# Patient Record
Sex: FEMALE | Race: WHITE | NOT HISPANIC OR LATINO | Employment: OTHER | URBAN - METROPOLITAN AREA
[De-identification: names, ages, dates, MRNs, and addresses within clinical notes are randomized per-mention and may not be internally consistent; named-entity substitution may affect disease eponyms.]

---

## 2017-02-07 ENCOUNTER — ALLSCRIPTS OFFICE VISIT (OUTPATIENT)
Dept: OTHER | Facility: OTHER | Age: 67
End: 2017-02-07

## 2017-02-07 DIAGNOSIS — G45.9 TRANSIENT CEREBRAL ISCHEMIC ATTACK: ICD-10-CM

## 2017-02-07 DIAGNOSIS — R05.9 COUGH: ICD-10-CM

## 2017-02-07 LAB
FLUAV AG SPEC QL IA: NEGATIVE
FLUAV AG SPEC QL IA: NEGATIVE
INFLUENZA B AG (HISTORICAL): NEGATIVE
INFLUENZA B AG (HISTORICAL): NEGATIVE

## 2017-02-08 ENCOUNTER — TRANSCRIBE ORDERS (OUTPATIENT)
Dept: ADMINISTRATIVE | Age: 67
End: 2017-02-08

## 2017-02-08 ENCOUNTER — HOSPITAL ENCOUNTER (OUTPATIENT)
Dept: RADIOLOGY | Age: 67
Discharge: HOME | End: 2017-02-08
Payer: MEDICARE

## 2017-02-08 DIAGNOSIS — R05.9 COUGH: ICD-10-CM

## 2017-02-08 PROCEDURE — 71020 HB CHEST X-RAY 2VW FRONTAL&LATL: CPT

## 2017-02-09 ENCOUNTER — GENERIC CONVERSION - ENCOUNTER (OUTPATIENT)
Dept: OTHER | Facility: OTHER | Age: 67
End: 2017-02-09

## 2017-03-07 LAB
A/G RATIO (HISTORICAL): 1.9 (CALC) (ref 1–2.5)
ALBUMIN SERPL BCP-MCNC: 4.3 G/DL (ref 3.6–5.1)
ALP SERPL-CCNC: 138 U/L (ref 33–130)
ALT SERPL W P-5'-P-CCNC: 26 U/L (ref 6–29)
AST SERPL W P-5'-P-CCNC: 24 U/L (ref 10–35)
BILIRUB SERPL-MCNC: 0.6 MG/DL (ref 0.2–1.2)
BUN SERPL-MCNC: 15 MG/DL (ref 7–25)
BUN/CREA RATIO (HISTORICAL): ABNORMAL (CALC) (ref 6–22)
CALCIUM (ADJUSTED FOR ALBUMIN) (HISTORICAL): 10.1 MG/DL (CALC) (ref 8.6–10.2)
CALCIUM SERPL-MCNC: 10 MG/DL (ref 8.6–10.4)
CHLORIDE SERPL-SCNC: 106 MMOL/L (ref 98–110)
CHOLEST SERPL-MCNC: 187 MG/DL (ref 125–200)
CHOLEST/HDLC SERPL: 2.5 (CALC)
CO2 SERPL-SCNC: 27 MMOL/L (ref 20–31)
CREAT SERPL-MCNC: 0.8 MG/DL (ref 0.5–0.99)
EGFR AFRICAN AMERICAN (HISTORICAL): 88 ML/MIN/1.73M2
EGFR-AMERICAN CALC (HISTORICAL): 76 ML/MIN/1.73M2
GAMMA GLOBULIN (HISTORICAL): 2.3 G/DL (CALC) (ref 1.9–3.7)
GLUCOSE (HISTORICAL): 81 MG/DL (ref 65–99)
HDLC SERPL-MCNC: 76 MG/DL
LDL CHOLESTEROL (HISTORICAL): 93 MG/DL (CALC)
NON-HDL-CHOL (CHOL-HDL) (HISTORICAL): 111 MG/DL (CALC)
POTASSIUM SERPL-SCNC: 3.9 MMOL/L (ref 3.5–5.3)
SODIUM SERPL-SCNC: 142 MMOL/L (ref 135–146)
TOTAL PROTEIN (HISTORICAL): 6.6 G/DL (ref 6.1–8.1)
TRIGL SERPL-MCNC: 92 MG/DL

## 2017-03-13 ENCOUNTER — GENERIC CONVERSION - ENCOUNTER (OUTPATIENT)
Dept: OTHER | Facility: OTHER | Age: 67
End: 2017-03-13

## 2017-06-15 ENCOUNTER — TRANSCRIBE ORDERS (OUTPATIENT)
Dept: ADMINISTRATIVE | Facility: HOSPITAL | Age: 67
End: 2017-06-15

## 2017-06-15 DIAGNOSIS — Z13.820 ENCOUNTER FOR SCREENING FOR OSTEOPOROSIS: Primary | ICD-10-CM

## 2017-06-26 ENCOUNTER — ALLSCRIPTS OFFICE VISIT (OUTPATIENT)
Dept: OTHER | Facility: OTHER | Age: 67
End: 2017-06-26

## 2017-06-26 ENCOUNTER — APPOINTMENT (OUTPATIENT)
Dept: RADIOLOGY | Age: 67
End: 2017-06-26
Payer: MEDICARE

## 2017-06-26 ENCOUNTER — TRANSCRIBE ORDERS (OUTPATIENT)
Dept: ADMINISTRATIVE | Age: 67
End: 2017-06-26

## 2017-06-26 DIAGNOSIS — M25.551 PAIN IN RIGHT HIP: ICD-10-CM

## 2017-06-26 DIAGNOSIS — E04.2 NONTOXIC MULTINODULAR GOITER: ICD-10-CM

## 2017-06-26 PROCEDURE — 73502 X-RAY EXAM HIP UNI 2-3 VIEWS: CPT

## 2017-06-28 ENCOUNTER — GENERIC CONVERSION - ENCOUNTER (OUTPATIENT)
Dept: OTHER | Facility: OTHER | Age: 67
End: 2017-06-28

## 2017-06-30 ENCOUNTER — HOSPITAL ENCOUNTER (OUTPATIENT)
Dept: NON INVASIVE DIAGNOSTICS | Facility: CLINIC | Age: 67
Discharge: HOME | End: 2017-06-30
Payer: MEDICARE

## 2017-06-30 DIAGNOSIS — G45.9 TRANSIENT CEREBRAL ISCHEMIC ATTACK: ICD-10-CM

## 2017-06-30 PROCEDURE — 93880 EXTRACRANIAL BILAT STUDY: CPT

## 2017-07-01 ENCOUNTER — GENERIC CONVERSION - ENCOUNTER (OUTPATIENT)
Dept: OTHER | Facility: OTHER | Age: 67
End: 2017-07-01

## 2017-07-05 ENCOUNTER — HOSPITAL ENCOUNTER (OUTPATIENT)
Dept: RADIOLOGY | Age: 67
Discharge: HOME | End: 2017-07-05
Payer: MEDICARE

## 2017-07-05 DIAGNOSIS — Z13.820 ENCOUNTER FOR SCREENING FOR OSTEOPOROSIS: ICD-10-CM

## 2017-07-05 DIAGNOSIS — Z12.31 ENCOUNTER FOR SCREENING MAMMOGRAM FOR MALIGNANT NEOPLASM OF BREAST: ICD-10-CM

## 2017-07-05 PROCEDURE — G0202 SCR MAMMO BI INCL CAD: HCPCS

## 2017-07-05 PROCEDURE — 77080 DXA BONE DENSITY AXIAL: CPT

## 2017-07-07 ENCOUNTER — LAB CONVERSION - ENCOUNTER (OUTPATIENT)
Dept: OTHER | Facility: OTHER | Age: 67
End: 2017-07-07

## 2017-07-07 LAB
25(OH)D3 SERPL-MCNC: 39 NG/ML (ref 30–100)
A/G RATIO (HISTORICAL): 2 (CALC) (ref 1–2.5)
ALBUMIN SERPL BCP-MCNC: 4.2 G/DL (ref 3.6–5.1)
ALP SERPL-CCNC: 106 U/L (ref 33–130)
ALT SERPL W P-5'-P-CCNC: 26 U/L (ref 6–29)
AST SERPL W P-5'-P-CCNC: 27 U/L (ref 10–35)
BILIRUB SERPL-MCNC: 0.7 MG/DL (ref 0.2–1.2)
BILIRUBIN DIRECT (HISTORICAL): 0.2 MG/DL
BUN SERPL-MCNC: 19 MG/DL (ref 7–25)
BUN/CREA RATIO (HISTORICAL): NORMAL (CALC) (ref 6–22)
CHLORIDE SERPL-SCNC: 106 MMOL/L (ref 98–110)
CO2 SERPL-SCNC: 27 MMOL/L (ref 20–31)
CREAT SERPL-MCNC: 0.75 MG/DL (ref 0.5–0.99)
DEPRECATED RDW RBC AUTO: 13.2 % (ref 11–15)
EGFR AFRICAN AMERICAN (HISTORICAL): 94 ML/MIN/1.73M2
EGFR-AMERICAN CALC (HISTORICAL): 81 ML/MIN/1.73M2
FERRITIN SERPL-MCNC: 31 NG/ML (ref 20–288)
FOLATE SERPL-MCNC: 18.7 NG/ML
GAMMA GLOBULIN (HISTORICAL): 2.1 G/DL (CALC) (ref 1.9–3.7)
GLUCOSE (HISTORICAL): 82 MG/DL (ref 65–99)
HCT VFR BLD AUTO: 43.2 % (ref 35–45)
HGB BLD-MCNC: 14.2 G/DL (ref 11.7–15.5)
INDIRECT BILIRUBIN (HISTORICAL): 0.5 MG/DL (CALC) (ref 0.2–1.2)
IRON SATN MFR SERPL: 43 % (CALC) (ref 11–50)
IRON SERPL-MCNC: 170 MCG/DL (ref 45–160)
MCH RBC QN AUTO: 31.7 PG (ref 27–33)
MCHC RBC AUTO-ENTMCNC: 32.8 G/DL (ref 32–36)
MCV RBC AUTO: 96.8 FL (ref 80–100)
PLATELET # BLD AUTO: 141 THOUSAND/UL (ref 140–400)
PMV BLD AUTO: 8.2 FL (ref 7.5–12.5)
POTASSIUM SERPL-SCNC: 4.3 MMOL/L (ref 3.5–5.3)
RBC # BLD AUTO: 4.47 MILLION/UL (ref 3.8–5.1)
SODIUM SERPL-SCNC: 140 MMOL/L (ref 135–146)
TIBC SERPL-MCNC: 394 MCG/DL (CALC) (ref 250–450)
TOTAL PROTEIN (HISTORICAL): 6.3 G/DL (ref 6.1–8.1)
TSH SERPL DL<=0.05 MIU/L-ACNC: 1.28 MIU/L (ref 0.4–4.5)
VIT B12 SERPL-MCNC: 433 PG/ML (ref 200–1100)
WBC # BLD AUTO: 4 THOUSAND/UL (ref 3.8–10.8)

## 2017-07-12 ENCOUNTER — ALLSCRIPTS OFFICE VISIT (OUTPATIENT)
Dept: OTHER | Facility: OTHER | Age: 67
End: 2017-07-12

## 2017-07-17 ENCOUNTER — GENERIC CONVERSION - ENCOUNTER (OUTPATIENT)
Dept: OTHER | Facility: OTHER | Age: 67
End: 2017-07-17

## 2017-08-07 ENCOUNTER — GENERIC CONVERSION - ENCOUNTER (OUTPATIENT)
Dept: OTHER | Facility: OTHER | Age: 67
End: 2017-08-07

## 2017-12-11 ENCOUNTER — GENERIC CONVERSION - ENCOUNTER (OUTPATIENT)
Dept: OTHER | Facility: OTHER | Age: 67
End: 2017-12-11

## 2017-12-11 DIAGNOSIS — Z00.00 ENCOUNTER FOR GENERAL ADULT MEDICAL EXAMINATION WITHOUT ABNORMAL FINDINGS: ICD-10-CM

## 2017-12-13 ENCOUNTER — LAB CONVERSION - ENCOUNTER (OUTPATIENT)
Dept: OTHER | Facility: OTHER | Age: 67
End: 2017-12-13

## 2017-12-13 LAB
BASOPHILS # BLD AUTO: 0.6 %
BASOPHILS # BLD AUTO: 29 CELLS/UL (ref 0–200)
DEPRECATED RDW RBC AUTO: 13.2 % (ref 11–15)
EOSINOPHIL # BLD AUTO: 202 CELLS/UL (ref 15–500)
EOSINOPHIL # BLD AUTO: 4.2 %
FERRITIN SERPL-MCNC: 16 NG/ML (ref 20–288)
HCT VFR BLD AUTO: 37.2 % (ref 35–45)
HGB BLD-MCNC: 11.6 G/DL (ref 11.7–15.5)
IRON SATN MFR SERPL: 23 % (CALC) (ref 11–50)
IRON SERPL-MCNC: 100 MCG/DL (ref 45–160)
LYMPHOCYTES # BLD AUTO: 1531 CELLS/UL (ref 850–3900)
LYMPHOCYTES # BLD AUTO: 31.9 %
MCH RBC QN AUTO: 27.5 PG (ref 27–33)
MCHC RBC AUTO-ENTMCNC: 31.2 G/DL (ref 32–36)
MCV RBC AUTO: 88.2 FL (ref 80–100)
MONOCYTES # BLD AUTO: 490 CELLS/UL (ref 200–950)
MONOCYTES (HISTORICAL): 10.2 %
NEUTROPHILS # BLD AUTO: 2549 CELLS/UL (ref 1500–7800)
NEUTROPHILS # BLD AUTO: 53.1 %
PLATELET # BLD AUTO: 202 THOUSAND/UL (ref 140–400)
PMV BLD AUTO: 10.5 FL (ref 7.5–12.5)
RBC # BLD AUTO: 4.22 MILLION/UL (ref 3.8–5.1)
TIBC SERPL-MCNC: 437 MCG/DL (CALC) (ref 250–450)
WBC # BLD AUTO: 4.8 THOUSAND/UL (ref 3.8–10.8)

## 2017-12-15 ENCOUNTER — GENERIC CONVERSION - ENCOUNTER (OUTPATIENT)
Dept: OTHER | Facility: OTHER | Age: 67
End: 2017-12-15

## 2018-01-09 NOTE — RESULT NOTES
Message  Blood work looks fine  Osteoporosis on DXA- will discuss treatment further on next visit  Prednisone helped somewhat with her hip  I will send for a taper she can take while she is on vacation next week if we cannot get her in with ortho/IR  She would like records of her xrays-reports can be sent from here, images she will need to get from the hospital      Verified Results  (Q) CBC (H/H, RBC, INDICES, WBC, PLT) [id number] 09:39AM Rich Fines     Test Name Result Flag Reference   WHITE BLOOD CELL COUNT 4 0 Thousand/uL  3 8-10 8   RED BLOOD CELL COUNT 4 47 Million/uL  3 80-5 10   HEMOGLOBIN 14 2 g/dL  11 7-15 5   HEMATOCRIT 43 2 %  35 0-45 0   MCV 96 8 fL  80 0-100 0   MCH 31 7 pg  27 0-33 0   MCHC 32 8 g/dL  32 0-36 0   RDW 13 2 %  11 0-15 0   PLATELET COUNT 096 Thousand/uL  140-400   MPV 8 2 fL  7 5-12 5     (Q) BASIC METABOLIC PANEL W/O CA [id number] 09:39AM Rich Fines     Test Name Result Flag Reference   GLUCOSE 82 mg/dL  65-99   Fasting reference interval   UREA NITROGEN (BUN) 19 mg/dL  7-25   CREATININE 0 75 mg/dL  0 50-0 99   For patients >52years of age, the reference limit  for Creatinine is approximately 13% higher for people  identified as -American  eGFR NON-AFR   AMERICAN 81 mL/min/1 73m2  > OR = 60   eGFR AFRICAN AMERICAN 94 mL/min/1 73m2  > OR = 60   BUN/CREATININE RATIO   7-13   NOT APPLICABLE (calc)   SODIUM 140 mmol/L  135-146   POTASSIUM 4 3 mmol/L  3 5-5 3   CHLORIDE 106 mmol/L     CARBON DIOXIDE 27 mmol/L  20-31     (Q) IRON, TIBC AND FERRITIN PANEL [id number] 09:39AM Rich Fines     Test Name Result Flag Reference   IRON, TOTAL 170 mcg/dL H    IRON BINDING CAPACITY 394 mcg/dL (calc)  250-450   % SATURATION 43 % (calc)  11-50   FERRITIN 31 ng/mL       (Q) VITAMIN B12/FOLATE, SERUM PANEL [id number] 09:39AM Rich Fines     Test Name Result Flag Reference   VITAMIN B12 433 pg/mL  200-1100   FOLATE, SERUM 18 7 ng/mL     Reference Range                             Low: <3 4                             Borderline:    3 4-5 4                             Normal:        >5 4     *(Q) VITAMIN D, 25-HYDROXY, LC/MS/MS [id number] 09:39AM Karthikeyan Chairez     Test Name Result Flag Reference   VITAMIN D, 25-OH, TOTAL 39 ng/mL     Vitamin D Status         25-OH Vitamin D:     Deficiency:                    <20 ng/mL  Insufficiency:             20 - 29 ng/mL  Optimal:                 > or = 30 ng/mL     For 25-OH Vitamin D testing on patients on   D2-supplementation and patients for whom quantitation   of D2 and D3 fractions is required, the QuestAssureD(TM)  25-OH VIT D, (D2,D3), LC/MS/MS is recommended: order   code 65872 (patients >2yrs)  For more information on this test, go to:  http://Pict/faq/URF760  (This link is being provided for   informational/educational purposes only )     (Q) TSH, 3RD GENERATION W/REFLEX TO FT4 [id number] 09:39AM Karthikeyan Chairez   REPORT COMMENT:  FASTING:YES     Test Name Result Flag Reference   TSH W/REFLEX TO FT4 1 28 mIU/L  0 40-4 50     (1) HEPATIC FUNCTION PANEL [id number] 09:39AM Karthikeyan Chairez     Test Name Result Flag Reference   PROTEIN, TOTAL 6 3 g/dL  6 1-8 1   ALBUMIN 4 2 g/dL  3 6-5 1   GLOBULIN 2 1 g/dL (calc)  1 9-3 7   ALBUMIN/GLOBULIN RATIO 2 0 (calc)  1 0-2 5   BILIRUBIN, TOTAL 0 7 mg/dL  0 2-1 2   BILIRUBIN, DIRECT 0 2 mg/dL  < OR = 0 2   BILIRUBIN, INDIRECT 0 5 mg/dL (calc)  0 2-1 2   ALKALINE PHOSPHATASE 106 U/L     AST 27 U/L  10-35   ALT 26 U/L  6-29     * DXA BONE DENSITY SPINE HIP AND PELVIS [id number] 10:29AM Karthikeyan Chairez     Test Name Result Flag Reference   DXA BONE DENSITY SPINE HIP AND PELVIS (Report)     DXA SCAN     CLINICAL HISTORY: 68-year-old woman  Menopause at age 44  OTHER RISK FACTORS: None  TECHNIQUE: Bone densitometry was performed using a Hologic Horizon A bone densitometer  Regions of interest appear properly placed  COMPARISON: December 16, 2010       RESULTS:      LUMBAR SPINE L1-L4: BMD 0 875 gm/cm2 / T-score -1 6 / Z score 0 5   (Lumbar levels within parentheses [if any] represent vertebrae excluded from analysis due to local structural abnormalities or artifact)  LEFT TOTAL HIP: BMD 0 720 gm/cm2 / T-score -1 8 / Z score -0 4   LEFT FEMORAL NECK: BMD 0 569 gm/cm2 / T score -2 5 / Z score -0 8     CHANGE: Since the last DXA:   LUMBAR SPINE BMD has decreased 0 034 gm/cm2 or 3 7%  This change is statistically significant  HIP BMD has decreased 0 019 gm/cm2 or 2 5%  This change is statistically significant  IMPRESSION:     1  Osteoporosis, based on the left femoral neck  2  Since a DXA study from December 16, 2010, bone mineral density has decreased 3 7% in the lumbar spine and 2 5% in the left hip  These changes are statistically significant  3  The 10 year risk of hip fracture is 3 1% with the 10 year risk of major osteoporotic fracture being 14% as calculated by the [facility]/WHO fracture risk assessment tool (FRAX)  4  The current NOF guidelines recommend treating patients with a T-score of -2 5 or less in the lumbar spine or hips, or in post-menopausal women and men over the age of 48 with low bone mass (osteopenia) and a FRAX 10 year risk score of >3% for hip    fracture and/or >20% for major osteoporotic fracture  5  A daily intake of at least 1200 mg calcium and vitamin D 800- 1000 IU, as well as weight bearing and muscle strengthening exercise, fall prevention and avoidance of tobacco and excessive alcohol as preventive measurements are suggested  6  Follow-up DXA in two years is recommended for most patients  A one year follow-up DXA is recommended after initiation or change in therapy for osteoporosis  More frequent evaluation is also appropriate for patients with conditions associated with    rapid bone loss, such as glucocorticoid therapy       The FRAX tool has not been validated in patients currently or previously treated with pharmacotherapy for osteoporosis  In such patients, clinical judgment must be exercised in interpreting FRAX scores  It is not appropriate to use FRAX to monitor    treatment response  WHO CLASSIFICATION:   Normal (a T-score of -1 0 or higher)   Low bone mineral density (a T-score of less than -1 0 but higher than -2 5)   Osteoporosis (a T-score of -2 5 or less)   Severe osteoporosis (a T-score of -2 5 or less with a fragility fracture)     Least significant change (lumbar spine): 0 014 g/cm2; 1 7%   Least significant change (total hip): 0 008 g/cm2; 1 1%   Least significant change (forearm): 0 011 g/cm2; 1 9%         Workstation performed: [id number]     Signed by:   Nikki Ballard MD   7/5/17       Plan  Right hip pain    · PredniSONE 10 MG Oral Tablet; 4 tabs PO daily for 3 days then 3 tabs PO daily for  2 days then 2 tabs PO daily for 2 days    Signatures   Electronically signed by : ALEXANDRIA Deng ; Jul 17 2017  5:52PM EST                       (Author)

## 2018-01-11 NOTE — RESULT NOTES
Message     Chest xray is normal   Finish antibiotic course        Verified Results  * XR CHEST PA & LATERAL [id number] 11:32AM Jesus Crown Order Number: [id number]     Test Name Result Flag Reference   XR CHEST PA & LATERAL (Report)     CHEST - DUAL ENERGY     INDICATION: Productive cough     COMPARISON: 8/7/2007     VIEWS: PA (including soft tissue/bone algorithms) and lateral projections; 4 images     FINDINGS:        Cardiomediastinal silhouette appears unremarkable  The lungs are clear  No pneumothorax or pleural effusion  Visualized osseous structures appear within normal limits for the patient's age  IMPRESSION:     No active pulmonary disease         Workstation performed: [id number]     Signed by:   Indira Bravo MD   2/9/17       Signatures   Electronically signed by : ALEXANDRIA Moreira ; Feb 9 2017  2:07PM EST                       (Author)

## 2018-01-12 VITALS
HEIGHT: 65 IN | BODY MASS INDEX: 29.66 KG/M2 | RESPIRATION RATE: 20 BRPM | DIASTOLIC BLOOD PRESSURE: 62 MMHG | TEMPERATURE: 100 F | HEART RATE: 115 BPM | WEIGHT: 178.03 LBS | SYSTOLIC BLOOD PRESSURE: 98 MMHG

## 2018-01-12 NOTE — RESULT NOTES
Subclavian        122      0                          CONCLUSION:   Impression   RIGHT:   There is <50% stenosis noted in the internal carotid artery  Plaque is   calcified  Vertebral artery flow is antegrade  There is no significant subclavian artery disease  Incidental finding: multiple thyroid nodules noted  LEFT:   There is <50% stenosis noted in the internal carotid artery  Plaque is   calcified  Vertebral artery flow is antegrade  There is no significant subclavian artery disease  Incidental finding: multiple thyroid nodules noted  Internal carotid artery stenosis determination by consensus criteria from:   Madhav Parada et al  Carotid Artery Stenosis: Gray-Scale and Doppler US Diagnosis   - Society of Radiologists in [address] [place] [address], Radiology 2003;   [phone]        SIGNATURE:   Electronically Signed by: Yonathan Bland MD on 2017-07-01 05:34:12 PM       Plan  Multinodular thyroid    · US THYROID; Status:Hold For - Scheduling; Requested for:[id number];     Signatures   Electronically signed by : ALEXANDRIA Moore ; Jul 1 2017  5:48PM EST                       (Author)

## 2018-01-13 VITALS
SYSTOLIC BLOOD PRESSURE: 144 MMHG | HEART RATE: 83 BPM | DIASTOLIC BLOOD PRESSURE: 85 MMHG | WEIGHT: 176 LBS | BODY MASS INDEX: 34.55 KG/M2 | HEIGHT: 60 IN

## 2018-01-13 VITALS
SYSTOLIC BLOOD PRESSURE: 138 MMHG | OXYGEN SATURATION: 97 % | BODY MASS INDEX: 34.55 KG/M2 | DIASTOLIC BLOOD PRESSURE: 78 MMHG | WEIGHT: 176 LBS | HEART RATE: 100 BPM | HEIGHT: 60 IN | TEMPERATURE: 97.9 F | RESPIRATION RATE: 16 BRPM

## 2018-01-17 NOTE — RESULT NOTES
Message  Spoke with pt  Start statin bec of h/o TIA,  LDL goal <70 and it is 93      Verified Results  (Q) LIPID PANEL WITH REFLEX TO DIRECT LDL [id number] 08:22AM Chinedu Worthy     Test Name Result Flag Reference   CHOLESTEROL, TOTAL 187 mg/dL  125-200   HDL CHOLESTEROL 76 mg/dL  > OR = 46   TRIGLICERIDES 92 mg/dL  <462   LDL-CHOLESTEROL 93 mg/dL (calc)  <130   Desirable range <100 mg/dL for patients with CHD or  diabetes and <70 mg/dL for diabetic patients with  known heart disease  CHOL/HDLC RATIO 2 5 (calc)  < OR = 5 0   NON HDL CHOLESTEROL 111 mg/dL (calc)     Target for non-HDL cholesterol is 30 mg/dL higher than   LDL cholesterol target  (Q) COMPREHENSIVE METABOLIC PNL W/ADJUSTED CALCIUM [id number] 08:22AM Chinedu Worthy   REPORT COMMENT:  FASTING:YES     Test Name Result Flag Reference   GLUCOSE 81 mg/dL  65-99   Fasting reference interval   UREA NITROGEN (BUN) 15 mg/dL  7-25   CREATININE 0 80 mg/dL  0 50-0 99   For patients >52years of age, the reference limit  for Creatinine is approximately 13% higher for people  identified as -American  eGFR NON-AFR  AMERICAN 76 mL/min/1 73m2  > OR = 60   eGFR AFRICAN AMERICAN 88 mL/min/1 73m2  > OR = 60   BUN/CREATININE RATIO   4-42   NOT APPLICABLE (calc)   SODIUM 142 mmol/L  135-146   POTASSIUM 3 9 mmol/L  3 5-5 3   CHLORIDE 106 mmol/L     CARBON DIOXIDE 27 mmol/L  20-31   CALCIUM 10 0 mg/dL  8 6-10 4   CALCIUM (ADJUSTED FOR$ALBUMIN) 10 1 mg/dL (calc)  8 6-10 2   PROTEIN, TOTAL 6 6 g/dL  6 1-8 1   ALBUMIN 4 3 g/dL  3 6-5 1   GLOBULIN 2 3 g/dL (calc)  1 9-3 7   ALBUMIN/GLOBULIN RATIO 1 9 (calc)  1 0-2 5   BILIRUBIN, TOTAL 0 6 mg/dL  0 2-1 2   ALKALINE PHOSPHATASE 138 U/L H    AST 24 U/L  10-35   ALT 26 U/L  6-29       Plan  Transient ischemic attack (TIA)    · Atorvastatin Calcium 10 MG Oral Tablet; Take 1 tablet daily   · (1) CK (CPK); Status:Active; Requested FZZ:[id number];    · (Q) HEPATIC FUNCTION PANEL; Status:Active;  Requested QAZ:53BRA0992; · (Q) LIPID PANEL WITH REFLEX TO DIRECT LDL; Status:Active;  Requested  ATV:36BLD8400;     Signatures   Electronically signed by : ALEXANDRIA Ponce ; Mar 13 2017 10:49AM EST                       (Author)

## 2018-01-17 NOTE — RESULT NOTES
Message   #1  Please call the patient with the results of her mammogram       #2  The radiologist states that her mammogram looks well and recommends repeating another mammogram in 1 year, unless she feels something sooner  #3  Mail her the order slip for next year's mammogram       #4  You may leave a message, if her communication consent allows for it  Verified Results  * MAMMO SCREENING BILATERAL W CAD [id number] 10:17AM Tito Hamilton Order Number: [id number]   Performing Comments: MAIL copy to Dr Estuardo Duque   - Patient Instructions: To schedule this appointment, please contact Central Scheduling at [phone]  Do not wear any perfume, powder, lotion or deodorant on breast or underarm area  Please bring your doctors order, referral (if needed) and insurance information with you on the day of the test  Failure to bring this information may result in this test being rescheduled  Arrive 15 minutes prior to your appointment time to register  On the day of your test, please bring any prior mammogram or breast studies with you that were not performed at a [facility]  Failure to bring prior exams may result in your test needing to be rescheduled  Test Name Result Flag Reference   MAMMO SCREENING BILATERAL W CAD (Report)     Patient History:   Patient is postmenopausal    Family history of prostate cancer in father at age 61 and    premenopausal breast cancer in daughter at age 43  Took hormonal contraceptives for 15 years  Took estrogen for 10    years  Took unspecified hormones for 5 years  Patient has never smoked  Patient's BMI is 30 0  Reason for exam: screening (asymptomatic)  Mammo Screening Bilateral W CAD: June 29, 2016 - Check In #:    [de-identified]   Bilateral CC and MLO view(s) were taken  Technologist: RT Dominick(R)(M)   Prior study comparison: June 24, 2015, digital bilateral    screening mammogram performed at [address] [place] [address]      June 18, 2014, digital bilateral screening mammogram performed at   [address] [facility]  June 12, 2013, digital bilateral    screening mammogram performed at [facility]  June 1, 2012, digital bilateral screening mammogram performed at    [facility]  May 27, 2011, digital bilateral    screening mammogram performed at [facility]  There are scattered fibroglandular densities  No dominant soft tissue mass, architectural distortion or    suspicious calcifications are noted in either breast   The skin    and nipple structures are within normal limits  Scattered benign   appearing calcifications are noted  No significant changes when compared with prior studies  ASSESSMENT: BiRad:2 - Benign     Recommendation:   Routine screening mammogram of both breasts in 1 year  A    reminder letter will be scheduled  Analyzed by CAD     8-10% of cancers will be missed on mammography  Management of a    palpable abnormality must be based on clinical grounds  Patients   will be notified of their results via letter from our facility  Accredited by [employer] of Radiology and FDA       Transcription Location: LINDA [facility] 98: [id number]     Risk Value(s):   Tyrer-Cuzick 10 Year: 3 964%, Tyrer-Cuzick Lifetime: 7 134%,    Myriad Table: 2 6%, YARELIS 5 Year: 3 3%, NCI Lifetime: 10 4%       Plan  Encounter for screening mammogram for malignant neoplasm of breast    · * MAMMO SCREENING BILATERAL W CAD; Status:Hold For - Scheduling; Requested  for:30Jun2017;

## 2018-01-17 NOTE — RESULT NOTES
Message   Mild osteoarthritis in her right hip   Schedule with Dr Thea Rodriguez's orthopedics        Verified Results  * XR HIP/PELV 2-3 VWS RIGHT W PELVIS IF PERFORMED 26Jun2017 11:12AM Chaparrita Beatty Order Number: [id number]     Test Name Result Flag Reference   * XR HIP/PELV 2-3 VWS RIGHT (Report)     RIGHT HIP     INDICATION: Right lateral hip pain     COMPARISON: None     VIEWS: AP pelvis and 2 coned down views of the hip     IMAGES: 3     FINDINGS:     There is no acute fracture or dislocation  Diffuse joint space narrowing is seen throughout the hip joint  Minimal hypertrophic changes are noted in the acetabulum  Femoral head is unremarkable  Degenerative changes are also seen within the lower lumbar spine  Soft tissues are unremarkable  IMPRESSION:     Mild osteoarthritis right hip         Workstation performed: [id number]     Signed by:   Ana Hunter MD   6/28/17       Signatures   Electronically signed by : ALEXANDRIA Patricio ; Jun 28 2017  5:26PM EST                       (Author)

## 2018-01-18 NOTE — PROGRESS NOTES
Chief Complaint  I spoke with patient and explained to her about seeing pain management in regards to the cortisol injection but patient declined and will not make an appt  She "does not want to be bounced around from consult to consult with different providers " Later this week she will be relocating to [place] and will be transferring records down there  Active Problems    1  Anxiety disorder (300 00) (F41 9)   2  Benign essential hypertension (401 1) (I10)   3  Cerebrovascular disease (437 9) (I67 9)   4  Cough (786 2) (R05)   5  Encounter for screening for osteoporosis (V82 81) (Z13 820)   6  Encounter for screening for other nervous system disorders (V80 09) (Z13 858)   7  Encounter for screening mammogram for malignant neoplasm of breast (V76 12)   (Z12 31)   8  Esophageal reflux (530 81) (K21 9)   9  Hypogammaglobulinemia (279 00) (D80 1)   10  Insomnia (780 52) (G47 00)   11  Long term use of drug (V58 69) (Z79 899)   12  Mallet deformity of fourth finger, 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)   13  Multinodular thyroid (241 1) (E04 2)   14  Neck pain (723 1) (M54 2)   15  Need for influenza vaccination (V04 81) (Z23)   16  Need for pneumococcal vaccine (V03 82) (Z23)   17  Need for shingles vaccine (V04 89) (Z23)   18  Obesity (278 00) (E66 9)   19  Osteoarthritis of right hip (715 95) (M16 11)   20  Osteoporosis (733 00) (M81 0)   21  Right hip pain (719 45) (M25 551)   22  Screening for genitourinary condition (V81 6) (Z13 89)   23  Screening for lipoid disorders (V77 91) (Z13 220)   24  Screening for neurological condition (V80 09) (Z13 89)   25  Skin cancer, basal cell (173 91) (C44 91)   26  Status post gastric bypass for obesity (V45 86) (Z98 84)   27  Transient ischemic attack (TIA) (435 9) (G45 9)    Current Meds   1  Aspirin 325 MG Oral Tablet; TAKE 1 TABLET DAILY; Therapy: 38Lmd4332 to Recorded   2  Atorvastatin Calcium 10 MG Oral Tablet; Take 1 tablet by mouth  daily;    Therapy: 16ONW7268 to (97 103259)  Requested for: 01Aug2017; Last   Rx:01Aug2017 Ordered   3  Calcium 500 MG TABS; TAKE 1 TABLET DAILY; Therapy: 90Dnp5011 to Recorded   4  Clopidogrel Bisulfate 75 MG Oral Tablet; Take 1 tablet daily; Therapy: 71MGC6143 to (Evaluate:65Uhn0117)  Requested for: 72Nkm9973; Last   Rx:36Ppd0385 Ordered   5  Lisinopril 10 MG Oral Tablet; TAKE 1 TABLET DAILY; Last Rx:22Feb2017 Ordered   6  Multi-Vitamin Daily Oral Tablet; TAKE 1 TABLET DAILY; Therapy: 82Xqa5076 to Recorded   7  Naproxen 500 MG Oral Tablet; take 1 tablet every 12 hours as needed for pain  Take with   food; Therapy: 09ROQ8347 to (Evaluate:74Gul8948)  Requested for: 26Jun2017; Last   Rx:26Jun2017 Ordered   8  PARoxetine HCl - 10 MG Oral Tablet; Take 1 tablet by mouth  daily; Therapy: 53UJX2993 to (97 653411)  Requested for: 01Aug2017; Last   Rx:01Aug2017 Ordered   9  PredniSONE 20 MG Oral Tablet; TAKE 2 TABLETS DAILY WITH FOOD; Therapy: 39ZZO7349 to (Evaluate:89Pmt9992)  Requested for: 09Rdr8664; Last   Rx:24Kyn1790 Ordered   10  Slow Fe 142 (45 Fe) MG Oral Tablet Extended Release; Take 1 tablet daily; Therapy: 50Pts2114 to Recorded   11  TraMADol HCl - 50 MG Oral Tablet; TAKE 1 TABLET 3 TIMES DAILY AS NEEDED; Therapy: 51OCI9380 to (Evaluate:16Wex1175); Last Rx:89Wnu4813 Ordered   12  Vitamin B-12 ER 1000 MCG Oral Tablet Extended Release; TAKE 1 TABLET DAILY; Therapy: 74Yyw7903 to (Evaluate:32Rkg6816) Recorded   13  Vitamin B50 Complex Oral Tablet Extended Release; TAKE 1 TABLET DAILY; Therapy: (Recorded:90Scr9629) to Recorded   14  Vitamin D3 1000 UNIT Oral Tablet; TAKE 1 TABLET DAILY; Therapy: 72Eps9155 to (Evaluate:02Wci7427) Recorded    Allergies    1  Shellfish-derived Products   2  Iodine Tincture SWAB    3  No Known Environmental Allergies   4   No Known Food Allergies    Signatures   Electronically signed by : ALEXANDRIA Moreira ; Aug  7 2017  4:55PM EST                       (Review)

## 2018-01-18 NOTE — RESULT NOTES
Message   #1  Please call the patient with the results of her blood tests  #2  The results look well and I recommed that she continue with her current medications, until her next office visit  #3  You may leave a message, if her communication consent allows for it  Verified Results  (Q) CBC (INCLUDES DIFF/PLT) (REFL) [id number] 07:58AM [facility]     Test Name Result Flag Reference   WHITE BLOOD CELL COUNT 4 5 Thousand/uL  3 8-10 8   RED BLOOD CELL COUNT 4 95 Million/uL  3 80-5 10   HEMOGLOBIN 15 1 g/dL  11 7-15 5   HEMATOCRIT 47 3 % H 35 0-45 0   MCV 95 5 fL  80 0-100 0   MCH 30 5 pg  27 0-33 0   MCHC 31 9 g/dL L 32 0-36 0   RDW 13 6 %  11 0-15 0   PLATELET COUNT 285 Thousand/uL  140-400   MPV 8 8 fL  7 5-11 5   ABSOLUTE NEUTROPHILS 2453 cells/uL  1655-3931   ABSOLUTE LYMPHOCYTES 1490 cells/uL  850-3900   ABSOLUTE MONOCYTES 392 cells/uL  200-950   ABSOLUTE EOSINOPHILS 153 cells/uL     ABSOLUTE BASOPHILS 14 cells/uL  0-200   NEUTROPHILS 54 5 %     LYMPHOCYTES 33 1 %     MONOCYTES 8 7 %     EOSINOPHILS 3 4 %     BASOPHILS 0 3 %       (Q) COMPREHENSIVE METABOLIC PNL W/ADJUSTED CALCIUM [id number] 07:58AM [facility]     Test Name Result Flag Reference   GLUCOSE 78 mg/dL  65-99   Fasting reference interval   UREA NITROGEN (BUN) 14 mg/dL  7-25   CREATININE 0 77 mg/dL  0 50-0 99   For patients >52years of age, the reference limit  for Creatinine is approximately 13% higher for people  identified as -American  eGFR NON-AFR   AMERICAN 79 mL/min/1 73m2  > OR = 60   eGFR AFRICAN AMERICAN 92 mL/min/1 73m2  > OR = 60   BUN/CREATININE RATIO   1-11   NOT APPLICABLE (calc)   SODIUM 141 mmol/L  135-146   POTASSIUM 4 5 mmol/L  3 5-5 3   CHLORIDE 106 mmol/L     CARBON DIOXIDE 23 mmol/L  19-30   CALCIUM 10 1 mg/dL  8 6-10 4   CALCIUM (ADJUSTED FOR$ALBUMIN) 10 0 mg/dL (calc)  8 6-10 2   PROTEIN, TOTAL 6 6 g/dL  6 1-8 1   ALBUMIN 4 5 g/dL  3 6-5 1   GLOBULIN 2 1 g/dL (calc)  1 9-3 7 ALBUMIN/GLOBULIN RATIO 2 1 (calc)  1 0-2 5   BILIRUBIN, TOTAL 0 7 mg/dL  0 2-1 2   ALKALINE PHOSPHATASE 105 U/L     AST 25 U/L  10-35   ALT 25 U/L  6-29     (Q) HEPATITIS C ANTIBODY 07Jun2016 07:58AM Molly Nava     Test Name Result Flag Reference   HEPATITIS C ANTIBODY NON-REACTIVE  NON-REACTIVE   SIGNAL TO CUT-OFF 0 01  <1 00     (Q) IMMUNOFIXATION IGA,IGG,IGM QT  IMMUNOFIXATION SERUM IMMUNOGLOBULIN [id number] 07:58AM Molly Nava     Test Name Result Flag Reference   INTERPRETATION      Normal pattern  No monoclonal proteins detected     IMMUNOGLOBULIN A 208 mg/dL     IMMUNOGLOBULIN G 571 mg/dL L 480-7490   IMMUNOGLOBULIN M 60 mg/dL       (Q) VITAMIN B12/FOLATE, SERUM PANEL 07Jun2016 07:58AM Molly Nava     Test Name Result Flag Reference   VITAMIN B12 570 pg/mL  200-1100   FOLATE, SERUM 19 8 ng/mL     Reference Range                             Low:           <3 4                             Borderline:    3 4-5 4                             Normal:        >5 4     (Q) TSH, 3RD GENERATION W/REFLEX TO FT4 [id number] 07:58AM Molly Nava   REPORT COMMENT:  FASTING:YES     Test Name Result Flag Reference   TSH W/REFLEX TO FT4 2 09 mIU/L  0 40-4 50     (1) IRON SATURATION %, TIBC [id number] 07:58AM Molly Nava     Test Name Result Flag Reference   IRON, TOTAL 145 mcg/dL     IRON BINDING CAPACITY 402 mcg/dL (calc)  250-450   % SATURATION 36 % (calc)  11-50

## 2018-01-23 NOTE — MISCELLANEOUS
Message  labs reviewed by Dr Man Almanzar  levels do not explain patient's SOB  offered patient appt with our PA  she declined at this time as she is seeing her PCP on Monday  patient will call office with further needs      Active Problems    1  Anxiety disorder (300 00) (F41 9)   2  Benign essential hypertension (401 1) (I10)   3  Cerebrovascular disease (437 9) (I67 9)   4  Cough (786 2) (R05)   5  Encounter for screening for osteoporosis (V82 81) (Z13 820)   6  Encounter for screening for other nervous system disorders (V80 09) (Z13 858)   7  Encounter for screening mammogram for malignant neoplasm of breast (V76 12)   (Z12 31)   8  Esophageal reflux (530 81) (K21 9)   9  Hypogammaglobulinemia (279 00) (D80 1)   10  Insomnia (780 52) (G47 00)   11  Long term use of drug (V58 69) (Z79 899)   12  Mallet deformity of fourth finger, 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)   13  Multinodular thyroid (241 1) (E04 2)   14  Neck pain (723 1) (M54 2)   15  Need for influenza vaccination (V04 81) (Z23)   16  Need for pneumococcal vaccine (V03 82) (Z23)   17  Need for shingles vaccine (V04 89) (Z23)   18  Obesity (278 00) (E66 9)   19  Osteoarthritis of right hip (715 95) (M16 11)   20  Osteoporosis (733 00) (M81 0)   21  Right hip pain (719 45) (M25 551)   22  Screening for genitourinary condition (V81 6) (Z13 89)   23  Screening for lipoid disorders (V77 91) (Z13 220)   24  Screening for neurological condition (V80 09) (Z13 89)   25  Skin cancer, basal cell (173 91) (C44 91)   26  Status post gastric bypass for obesity (V45 86) (Z98 84)   27  Transient ischemic attack (TIA) (435 9) (G45 9)    Current Meds   1  Aspirin 325 MG Oral Tablet; TAKE 1 TABLET DAILY; Therapy: 49Bqk4322 to Recorded   2  Atorvastatin Calcium 10 MG Oral Tablet; Take 1 tablet by mouth  daily; Therapy: 39OQT8418 to [phone])  Requested for: 01Aug2017; Last   Rx:01Aug2017 Ordered   3  Calcium 500 MG TABS; TAKE 1 TABLET DAILY;    Therapy: 07Aug2013 to Recorded   4  Clopidogrel Bisulfate 75 MG Oral Tablet; Take 1 tablet daily; Therapy: 74LGX8203 to (Evaluate:76Mqz9783)  Requested for: 26Lfv9220; Last   Rx:16Ebc2089 Ordered   5  Lisinopril 10 MG Oral Tablet; TAKE 1 TABLET DAILY; Last Rx:22Feb2017 Ordered   6  Multi-Vitamin Daily Oral Tablet; TAKE 1 TABLET DAILY; Therapy: 55Pdi1408 to Recorded   7  Naproxen 500 MG Oral Tablet; take 1 tablet every 12 hours as needed for pain  Take with   food; Therapy: 78FER8441 to (Evaluate:61Esn3275)  Requested for: 26Jun2017; Last   Rx:26Jun2017 Ordered   8  PARoxetine HCl - 10 MG Oral Tablet; Take 1 tablet by mouth  daily; Therapy: 07DEL7950 to ()  Requested for: 01Aug2017; Last   Rx:01Aug2017 Ordered   9  PredniSONE 20 MG Oral Tablet; TAKE 2 TABLETS DAILY WITH FOOD; Therapy: 48BSU5349 to (Evaluate:74Krn0538)  Requested for: 71Cnm2015; Last   Rx:44Ovm9037 Ordered   10  Slow Fe 142 (45 Fe) MG Oral Tablet Extended Release; Take 1 tablet daily; Therapy: 78Hsh1512 to Recorded   11  TraMADol HCl - 50 MG Oral Tablet; TAKE 1 TABLET 3 TIMES DAILY AS NEEDED; Therapy: 15HIJ8553 to (Evaluate:94Jym3553); Last Rx:01Icc5226 Ordered   12  Vitamin B-12 ER 1000 MCG Oral Tablet Extended Release; TAKE 1 TABLET DAILY; Therapy: 99Vey6757 to (Evaluate:88Coi5323) Recorded   13  Vitamin B50 Complex Oral Tablet Extended Release; TAKE 1 TABLET DAILY; Therapy: (Recorded:10Pmb1743) to Recorded   14  Vitamin D3 1000 UNIT Oral Tablet; TAKE 1 TABLET DAILY; Therapy: 44Dfs7360 to (Evaluate:28Ilj5335) Recorded    Allergies    1  Shellfish-derived Products   2  Iodine Tincture SWAB    3  No Known Environmental Allergies   4   No Known Food Allergies    Signatures   Electronically signed by : Grady Goss, ; Dec 15 2017  9:51AM EST                       (Author)

## 2018-01-23 NOTE — MISCELLANEOUS
Message   Recorded as Task   Date: 12/11/2017 02:12 PM, Created By: Mindy Quintanilla   Task Name: Medical Complaint Callback   Assigned To: Catracho Ayala   Regarding Patient: Kristen Gaona, Status: In Progress   Comment:    Rekha Panchal - 11 Dec 2017 2:12 PM     TASK CREATED  Caller: Self; Medical Complaint; [phone] (Mobile Phone); [phone] (Home)  PT IS C/O SHORTNESS OF BREATH WITH THE SLIGHTEST EFFORT  SHE WAS ADVISED TO GO TO THE ER BY HER PCP; HOWEVER, SHE FEELS LIKE WHEN SHE NEEDS AN IRON TRANSFUSION  HER PCP SAYS THAT SHE SHOULD KNOW  PLEASE CALL TO DISCUSS (F/U APPT 12/29 W/DR GARCÍA)  Bruno Shirley - 11 Dec 2017 2:20 PM     TASK EDITED  left message for patient   Mer Parmar - 11 Dec 2017 2:20 PM     TASK IN PROGRESS   Mer Parmar - 11 Dec 2017 2:29 PM     TASK EDITED  patient wll go for labs at [address] [place] [address] then call me for result   GhulamMer - 11 Dec 2017 2:32 PM     TASK EDITED  lab scripts faxed to [facility]    [address] - 11 Dec 2017 2:32 PM     TASK EDITED  patient will call office for results        Active Problems    1  Anxiety disorder (300 00) (F41 9)   2  Benign essential hypertension (401 1) (I10)   3  Cerebrovascular disease (437 9) (I67 9)   4  Cough (786 2) (R05)   5  Encounter for screening for osteoporosis (V82 81) (Z13 820)   6  Encounter for screening for other nervous system disorders (V80 09) (Z13 858)   7  Encounter for screening mammogram for malignant neoplasm of breast (V76 12)   (Z12 31)   8  Esophageal reflux (530 81) (K21 9)   9  Hypogammaglobulinemia (279 00) (D80 1)   10  Insomnia (780 52) (G47 00)   11  Long term use of drug (V58 69) (Z79 899)   12  Mallet deformity of fourth finger, 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)   13  Multinodular thyroid (241 1) (E04 2)   14  Neck pain (723 1) (M54 2)   15  Need for influenza vaccination (V04 81) (Z23)   16  Need for pneumococcal vaccine (V03 82) (Z23)   17  Need for shingles vaccine (V04 89) (Z23)   18  Obesity (278 00) (E66 9)   19  Osteoarthritis of right hip (715 95) (M16 11)   20  Osteoporosis (733 00) (M81 0)   21  Right hip pain (719 45) (M25 551)   22  Screening for genitourinary condition (V81 6) (Z13 89)   23  Screening for lipoid disorders (V77 91) (Z13 220)   24  Screening for neurological condition (V80 09) (Z13 89)   25  Skin cancer, basal cell (173 91) (C44 91)   26  Status post gastric bypass for obesity (V45 86) (Z98 84)   27  Transient ischemic attack (TIA) (435 9) (G45 9)    Current Meds   1  Aspirin 325 MG Oral Tablet; TAKE 1 TABLET DAILY; Therapy: 07Aug2013 to Recorded   2  Atorvastatin Calcium 10 MG Oral Tablet; Take 1 tablet by mouth  daily; Therapy: 96ZAP7790 to [phone])  Requested for: 01Aug2017; Last   Rx:01Aug2017 Ordered   3  Calcium 500 MG TABS; TAKE 1 TABLET DAILY; Therapy: 63Pxa3416 to Recorded   4  Clopidogrel Bisulfate 75 MG Oral Tablet; Take 1 tablet daily; Therapy: 87GLK8047 to (Evaluate:98Jtc9928)  Requested for: 93Pok1304; Last   Rx:90Jdh9729 Ordered   5  Lisinopril 10 MG Oral Tablet; TAKE 1 TABLET DAILY; Last Rx:06Ydu1582 Ordered   6  Multi-Vitamin Daily Oral Tablet; TAKE 1 TABLET DAILY; Therapy: 07Aug2013 to Recorded   7  Naproxen 500 MG Oral Tablet; take 1 tablet every 12 hours as needed for pain  Take with   food; Therapy: 75PLL5506 to (Evaluate:66Cmo4667)  Requested for: 26Jun2017; Last   Rx:26Jun2017 Ordered   8  PARoxetine HCl - 10 MG Oral Tablet; Take 1 tablet by mouth  daily; Therapy: 20QKY2555 to [phone])  Requested for: 01Aug2017; Last   Rx:01Aug2017 Ordered   9  PredniSONE 20 MG Oral Tablet; TAKE 2 TABLETS DAILY WITH FOOD; Therapy: 36YTS1457 to (Evaluate:75Lft7761)  Requested for: 71Vvj2262; Last   Rx:18Wgv3604 Ordered   10  Slow Fe 142 (45 Fe) MG Oral Tablet Extended Release; Take 1 tablet daily; Therapy: 36Yhm8808 to Recorded   11  TraMADol HCl - 50 MG Oral Tablet; TAKE 1 TABLET 3 TIMES DAILY AS NEEDED;     Therapy: 20KGP5928 to (Evaluate:70Tjt8554); Last Rx:71Zxs9755 Ordered   12  Vitamin B-12 ER 1000 MCG Oral Tablet Extended Release; TAKE 1 TABLET DAILY; Therapy: 28Kjk3004 to (Evaluate:17Iab1721) Recorded   13  Vitamin B50 Complex Oral Tablet Extended Release; TAKE 1 TABLET DAILY; Therapy: (Recorded:47Kjv7710) to Recorded   14  Vitamin D3 1000 UNIT Oral Tablet; TAKE 1 TABLET DAILY; Therapy: 39Viy6141 to (Evaluate:31Ixn0757) Recorded    Allergies    1  Shellfish-derived Products   2  Iodine Tincture SWAB    3  No Known Environmental Allergies   4  No Known Food Allergies    Plan  Health Maintenance    · (1) CBC/PLT/DIFF; Status:Active - Retrospective By Protocol Authorization; Requested  for:94Mve2326;    · (1) FERRITIN; Status:Active - Retrospective By Protocol Authorization; Requested  for:07Jls7170;    · (1) IRON PANEL; Status:Active - Retrospective By Protocol Authorization;  Requested  for:93Hvc9368;     Signatures   Electronically signed by : Jeff Johnston, ; Dec 11 2017  2:33PM EST                       (Author)

## 2018-04-30 DIAGNOSIS — I10 HYPERTENSION, ESSENTIAL, BENIGN: Primary | ICD-10-CM

## 2018-04-30 RX ORDER — LISINOPRIL 10 MG/1
1 TABLET ORAL DAILY
COMMUNITY
Start: 2018-01-16 | End: 2018-04-30 | Stop reason: SDUPTHER

## 2018-04-30 RX ORDER — LISINOPRIL 10 MG/1
10 TABLET ORAL DAILY
Qty: 90 TABLET | Refills: 0 | Status: SHIPPED | OUTPATIENT
Start: 2018-04-30 | End: 2018-07-26 | Stop reason: SDUPTHER

## 2018-05-03 NOTE — TELEPHONE ENCOUNTER
LMOM for pt to [facility] - also sent letter

## 2018-07-26 DIAGNOSIS — I10 HYPERTENSION, ESSENTIAL, BENIGN: ICD-10-CM

## 2018-07-27 RX ORDER — LISINOPRIL 10 MG/1
TABLET ORAL
Qty: 90 TABLET | Refills: 0 | Status: SHIPPED | OUTPATIENT
Start: 2018-07-27

## 2022-03-16 ENCOUNTER — APPOINTMENT (RX ONLY)
Dept: URBAN - METROPOLITAN AREA CLINIC 331 | Facility: CLINIC | Age: 72
Setting detail: DERMATOLOGY
End: 2022-03-16

## 2022-03-16 DIAGNOSIS — D18.0 HEMANGIOMA: ICD-10-CM

## 2022-03-16 DIAGNOSIS — L82.1 OTHER SEBORRHEIC KERATOSIS: ICD-10-CM

## 2022-03-16 DIAGNOSIS — Z85.828 PERSONAL HISTORY OF OTHER MALIGNANT NEOPLASM OF SKIN: ICD-10-CM

## 2022-03-16 DIAGNOSIS — L81.4 OTHER MELANIN HYPERPIGMENTATION: ICD-10-CM

## 2022-03-16 DIAGNOSIS — D22 MELANOCYTIC NEVI: ICD-10-CM

## 2022-03-16 PROBLEM — D48.5 NEOPLASM OF UNCERTAIN BEHAVIOR OF SKIN: Status: ACTIVE | Noted: 2022-03-16

## 2022-03-16 PROBLEM — D22.5 MELANOCYTIC NEVI OF TRUNK: Status: ACTIVE | Noted: 2022-03-16

## 2022-03-16 PROBLEM — D18.01 HEMANGIOMA OF SKIN AND SUBCUTANEOUS TISSUE: Status: ACTIVE | Noted: 2022-03-16

## 2022-03-16 PROCEDURE — 11102 TANGNTL BX SKIN SINGLE LES: CPT

## 2022-03-16 PROCEDURE — ? COUNSELING

## 2022-03-16 PROCEDURE — 11103 TANGNTL BX SKIN EA SEP/ADDL: CPT

## 2022-03-16 PROCEDURE — ? ADDITIONAL NOTES

## 2022-03-16 PROCEDURE — ? FULL BODY SKIN EXAM

## 2022-03-16 PROCEDURE — ? TREATMENT REGIMEN

## 2022-03-16 PROCEDURE — 99213 OFFICE O/P EST LOW 20 MIN: CPT | Mod: 25

## 2022-03-16 PROCEDURE — ? BIOPSY BY SHAVE METHOD

## 2022-03-16 ASSESSMENT — LOCATION SIMPLE DESCRIPTION DERM
LOCATION SIMPLE: UPPER BACK
LOCATION SIMPLE: RIGHT UPPER ARM
LOCATION SIMPLE: LEFT UPPER ARM
LOCATION SIMPLE: LEFT KNEE
LOCATION SIMPLE: CHEST
LOCATION SIMPLE: RIGHT BREAST
LOCATION SIMPLE: LEFT CHEEK
LOCATION SIMPLE: RIGHT UPPER BACK

## 2022-03-16 ASSESSMENT — LOCATION ZONE DERM
LOCATION ZONE: FACE
LOCATION ZONE: TRUNK
LOCATION ZONE: ARM
LOCATION ZONE: LEG

## 2022-03-16 ASSESSMENT — LOCATION DETAILED DESCRIPTION DERM
LOCATION DETAILED: RIGHT SUPERIOR UPPER BACK
LOCATION DETAILED: UPPER STERNUM
LOCATION DETAILED: RIGHT SUPERIOR MEDIAL UPPER BACK
LOCATION DETAILED: LEFT MEDIAL KNEE
LOCATION DETAILED: LEFT LATERAL PROXIMAL UPPER ARM
LOCATION DETAILED: LEFT ANTERIOR PROXIMAL UPPER ARM
LOCATION DETAILED: RIGHT ANTERIOR PROXIMAL UPPER ARM
LOCATION DETAILED: LEFT INFERIOR CENTRAL MALAR CHEEK
LOCATION DETAILED: SUPERIOR THORACIC SPINE
LOCATION DETAILED: RIGHT MEDIAL BREAST 2-3:00 REGION

## 2022-03-16 NOTE — PROCEDURE: BIOPSY BY SHAVE METHOD
Take guaifenesin, Ventolin, Motrin, and doxycycline as indicated  Stop taking Levaquin  Continue daily fluids and electrolytes  Continue daily humidifiers  Follow-up with PCP  Follow up with emergency department symptoms persist or exacerbate
Detail Level: Detailed
Depth Of Biopsy: dermis
Was A Bandage Applied: Yes
Size Of Lesion In Cm: 0
Biopsy Type: H and E
Biopsy Method: 15 blade
Anesthesia Type: 1% lidocaine with epinephrine
Anesthesia Volume In Cc (Will Not Render If 0): 1
Hemostasis: Drysol
Wound Care: Petrolatum
Dressing: bandage
Destruction After The Procedure: No
Type Of Destruction Used: Curettage
Curettage Text: The wound bed was treated with curettage after the biopsy was performed.
Cryotherapy Text: The wound bed was treated with cryotherapy after the biopsy was performed.
Electrodesiccation Text: The wound bed was treated with electrodesiccation after the biopsy was performed.
Electrodesiccation And Curettage Text: The wound bed was treated with electrodesiccation and curettage after the biopsy was performed.
Silver Nitrate Text: The wound bed was treated with silver nitrate after the biopsy was performed.
Lab: 6
Lab Facility: 3
Consent: Written consent was obtained and risks were reviewed including but not limited to scarring, infection, bleeding, scabbing, incomplete removal, nerve damage and allergy to anesthesia.
Post-Care Instructions: I reviewed with the patient in detail post-care instructions. Patient is to keep the current bandaid on for 24 hours then clean the site twice a day with dial soap and then apply vaseline or mupirocin (if prescribed and directed) twice daily until healed.
Notification Instructions: Patient will be notified of biopsy results. However, patient instructed to call the office if not contacted within 2 weeks.
Billing Type: Third-Party Bill
Information: Selecting Yes will display possible errors in your note based on the variables you have selected. This validation is only offered as a suggestion for you. PLEASE NOTE THAT THE VALIDATION TEXT WILL BE REMOVED WHEN YOU FINALIZE YOUR NOTE. IF YOU WANT TO FAX A PRELIMINARY NOTE YOU WILL NEED TO TOGGLE THIS TO 'NO' IF YOU DO NOT WANT IT IN YOUR FAXED NOTE.

## 2022-03-16 NOTE — PROCEDURE: COUNSELING
Detail Level: Zone
Detail Level: Generalized
Detail Level: Detailed
Crescentic Complex Repair Preamble Text (Leave Blank If You Do Not Want): Extensive wide undermining was performed.

## 2022-03-30 ENCOUNTER — RX ONLY (OUTPATIENT)
Age: 72
Setting detail: RX ONLY
End: 2022-03-30

## 2022-03-30 RX ORDER — CEPHALEXIN 500 MG/1
CAPSULE ORAL
Qty: 4 | Refills: 0 | Status: ERX | COMMUNITY
Start: 2022-03-30

## 2022-04-18 ENCOUNTER — APPOINTMENT (RX ONLY)
Dept: URBAN - METROPOLITAN AREA CLINIC 331 | Facility: CLINIC | Age: 72
Setting detail: DERMATOLOGY
End: 2022-04-18

## 2022-04-18 PROBLEM — C44.319 BASAL CELL CARCINOMA OF SKIN OF OTHER PARTS OF FACE: Status: ACTIVE | Noted: 2022-04-18

## 2022-04-18 PROCEDURE — 17311 MOHS 1 STAGE H/N/HF/G: CPT

## 2022-04-18 PROCEDURE — ? ADDITIONAL NOTES

## 2022-04-18 PROCEDURE — 13132 CMPLX RPR F/C/C/M/N/AX/G/H/F: CPT

## 2022-04-18 PROCEDURE — ? PRESCRIPTION

## 2022-04-18 PROCEDURE — ? MOHS SURGERY

## 2022-04-18 RX ORDER — MUPIROCIN 20 MG/G
OINTMENT TOPICAL BID
Qty: 22 | Refills: 1 | Status: ERX | COMMUNITY
Start: 2022-04-18

## 2022-04-18 RX ORDER — MINOCYCLINE HYDROCHLORIDE 100 MG/1
CAPSULE ORAL QD
Qty: 7 | Refills: 0 | Status: ERX | COMMUNITY
Start: 2022-04-18

## 2022-04-18 RX ADMIN — MUPIROCIN: 20 OINTMENT TOPICAL at 00:00

## 2022-04-18 RX ADMIN — MINOCYCLINE HYDROCHLORIDE: 100 CAPSULE ORAL at 00:00

## 2022-04-18 NOTE — PROCEDURE: MOHS SURGERY
English Crescentic Advancement Flap Text: The defect edges were debeveled with a #15 scalpel blade.  Given the location of the defect and the proximity to free margins a crescentic advancement flap was deemed most appropriate.  Using a sterile surgical marker, the appropriate advancement flap was drawn incorporating the defect and placing the expected incisions within the relaxed skin tension lines where possible.    The area thus outlined was incised deep to adipose tissue with a #15 scalpel blade.  The skin margins were undermined to an appropriate distance in all directions utilizing iris scissors.

## 2022-04-27 ENCOUNTER — APPOINTMENT (RX ONLY)
Dept: URBAN - METROPOLITAN AREA CLINIC 331 | Facility: CLINIC | Age: 72
Setting detail: DERMATOLOGY
End: 2022-04-27

## 2022-04-27 DIAGNOSIS — L81.4 OTHER MELANIN HYPERPIGMENTATION: ICD-10-CM

## 2022-04-27 DIAGNOSIS — Z48.817 ENCOUNTER FOR SURGICAL AFTERCARE FOLLOWING SURGERY ON THE SKIN AND SUBCUTANEOUS TISSUE: ICD-10-CM

## 2022-04-27 PROCEDURE — 99212 OFFICE O/P EST SF 10 MIN: CPT | Mod: 24

## 2022-04-27 PROCEDURE — ? POST-OP WOUND CHECK

## 2022-04-27 PROCEDURE — ? ADDITIONAL NOTES

## 2022-04-27 PROCEDURE — ? COUNSELING

## 2022-04-27 ASSESSMENT — LOCATION SIMPLE DESCRIPTION DERM
LOCATION SIMPLE: RIGHT FOREHEAD
LOCATION SIMPLE: RIGHT PRETIBIAL REGION

## 2022-04-27 ASSESSMENT — LOCATION ZONE DERM
LOCATION ZONE: LEG
LOCATION ZONE: FACE

## 2022-04-27 ASSESSMENT — LOCATION DETAILED DESCRIPTION DERM
LOCATION DETAILED: RIGHT SUPERIOR FOREHEAD
LOCATION DETAILED: RIGHT DISTAL PRETIBIAL REGION
LOCATION DETAILED: RIGHT PROXIMAL PRETIBIAL REGION

## 2022-04-27 NOTE — PROCEDURE: POST-OP WOUND CHECK
Detail Level: Detailed
Add 14133 Cpt? (Important Note: In 2017 The Use Of 32475 Is Being Tracked By Cms To Determine Future Global Period Reimbursement For Global Periods): no
Wound Evaluated By: Vero Tang PA-C

## 2022-04-27 NOTE — HPI: SKIN LESION
Is This A New Presentation, Or A Follow-Up?: Skin Lesion
Additional History: \\n\\nPt would like lesion checked

## 2022-09-21 ENCOUNTER — APPOINTMENT (RX ONLY)
Dept: URBAN - METROPOLITAN AREA CLINIC 331 | Facility: CLINIC | Age: 72
Setting detail: DERMATOLOGY
End: 2022-09-21

## 2022-09-21 NOTE — HPI: EVALUATION OF SKIN LESION(S)
What Type Of Note Output Would You Prefer (Optional)?: Bullet Format
Hpi Title: Evaluation of Skin Lesions
Additional History: Patient reports spot on left thigh that is concerning her.